# Patient Record
Sex: MALE | Race: BLACK OR AFRICAN AMERICAN | NOT HISPANIC OR LATINO
[De-identification: names, ages, dates, MRNs, and addresses within clinical notes are randomized per-mention and may not be internally consistent; named-entity substitution may affect disease eponyms.]

---

## 2022-12-05 ENCOUNTER — APPOINTMENT (OUTPATIENT)
Dept: ELECTROPHYSIOLOGY | Facility: CLINIC | Age: 47
End: 2022-12-05

## 2022-12-05 ENCOUNTER — NON-APPOINTMENT (OUTPATIENT)
Age: 47
End: 2022-12-05

## 2022-12-05 VITALS
BODY MASS INDEX: 44.73 KG/M2 | HEIGHT: 67 IN | OXYGEN SATURATION: 98 % | SYSTOLIC BLOOD PRESSURE: 145 MMHG | TEMPERATURE: 98.4 F | HEART RATE: 84 BPM | DIASTOLIC BLOOD PRESSURE: 89 MMHG | WEIGHT: 285 LBS

## 2022-12-05 PROCEDURE — 93000 ELECTROCARDIOGRAM COMPLETE: CPT

## 2022-12-05 PROCEDURE — 99205 OFFICE O/P NEW HI 60 MIN: CPT | Mod: 25

## 2022-12-05 NOTE — HISTORY OF PRESENT ILLNESS
[FreeTextEntry1] : Shon Colorado is a 46y/o man with Hx of HTN, HLD, DMII, and CVA who presents today for initial evaluation. Admits CVA back in October. He was at work and fell asleep and when he awoke he couldn't move his right leg and felt heaviness throughout the right side of his body. Was seen at [facility] and was found to have a CVA. Saw Cardiologist and pending SHARMIN. No cardiac monitoring.  Admits doing well with no issues or complaints. Denies chest pain, palpitations, SOB, syncope or near syncope.\par

## 2022-12-05 NOTE — CARDIOLOGY SUMMARY
[de-identified] : 11/4/2021: normal  [de-identified] : 10/28/2021: normal LV function, LVEF 60%

## 2022-12-05 NOTE — DISCUSSION/SUMMARY
[EKG obtained to assist in diagnosis and management of assessed problem(s)] : EKG obtained to assist in diagnosis and management of assessed problem(s) [FreeTextEntry1] : Impression:\par \par 1. CVA: EKG performed today to assess for presence of afib and reveals NSR. ECHO with normal LVEF. Discussed monitoring methods to assess for presence of possible paroxysmal afib such as 30 day CardioNet vs ILR monitoring. Recommend undergoing placement of ILR for long term monitoring of suspected atrial fibrillation in setting of CVA c/w Crystal AF study. Risks, benefits, and alternatives to procedure discussed. Will plan for CardioNet first prior to ILR. If CardioNet without evidence of afib, consider ILR at that time.\par \par 2. HTN: resume oral antihypertensives as prescribed. Encouraged heart healthy diet, sodium restriction, and weight loss. Continue regular f/u with Cardiologist for further HTN management.\par \par 3. HLD: resume statin therapy as prescribed and regular f/u with Cardiologist for routine lipid monitoring and management.\par \par Plan for 30 day CardioNet and ILR in future.

## 2022-12-23 ENCOUNTER — OUTPATIENT (OUTPATIENT)
Dept: OUTPATIENT SERVICES | Facility: HOSPITAL | Age: 47
LOS: 1 days | End: 2022-12-23
Payer: COMMERCIAL

## 2022-12-23 DIAGNOSIS — Z86.73 PERSONAL HISTORY OF TRANSIENT ISCHEMIC ATTACK (TIA), AND CEREBRAL INFARCTION WITHOUT RESIDUAL DEFICITS: ICD-10-CM

## 2022-12-23 LAB — GLUCOSE BLDC GLUCOMTR-MCNC: 108 MG/DL — HIGH (ref 70–99)

## 2022-12-23 PROCEDURE — 82962 GLUCOSE BLOOD TEST: CPT

## 2022-12-23 PROCEDURE — 93312 ECHO TRANSESOPHAGEAL: CPT

## 2022-12-23 PROCEDURE — 93320 DOPPLER ECHO COMPLETE: CPT | Mod: 26

## 2022-12-23 PROCEDURE — 93320 DOPPLER ECHO COMPLETE: CPT

## 2022-12-23 PROCEDURE — 93325 DOPPLER ECHO COLOR FLOW MAPG: CPT | Mod: 26

## 2022-12-23 PROCEDURE — 93312 ECHO TRANSESOPHAGEAL: CPT | Mod: 26

## 2022-12-23 PROCEDURE — 93325 DOPPLER ECHO COLOR FLOW MAPG: CPT

## 2023-03-21 ENCOUNTER — APPOINTMENT (OUTPATIENT)
Dept: NEUROLOGY | Facility: CLINIC | Age: 48
End: 2023-03-21
Payer: COMMERCIAL

## 2023-03-21 VITALS
OXYGEN SATURATION: 98 % | WEIGHT: 287 LBS | DIASTOLIC BLOOD PRESSURE: 84 MMHG | TEMPERATURE: 97.9 F | BODY MASS INDEX: 45.04 KG/M2 | SYSTOLIC BLOOD PRESSURE: 132 MMHG | HEART RATE: 96 BPM | HEIGHT: 67 IN

## 2023-03-21 DIAGNOSIS — E66.9 OBESITY, UNSPECIFIED: ICD-10-CM

## 2023-03-21 PROCEDURE — 99205 OFFICE O/P NEW HI 60 MIN: CPT

## 2023-03-21 NOTE — CONSULT LETTER
[Dear  ___] : Dear  [unfilled], [Consult Letter:] : I had the pleasure of evaluating your patient, [unfilled]. [Please see my note below.] : Please see my note below. [Consult Closing:] : Thank you very much for allowing me to participate in the care of this patient.  If you have any questions, please do not hesitate to contact me. [Sincerely,] : Sincerely, [FreeTextEntry3] : Mehreen Jamison MD, MPH\par

## 2023-03-21 NOTE — ASSESSMENT
[FreeTextEntry1] : And was noted in [facility] where it right-sided numbness and weakness due to presumed stroke, discharge paperwork not available, patient's symptoms that resolved.  He has history of hypertension and hyperlipidemia as well as diabetes which do increase his risk for lacunar infraction.  Holter monitor to date has been unremarkable.  For secondary stroke prevention, will plan to continue with aspirin and statin, patient does not know the milligrams of statin that he takes at this time and will bring at next visit.  Blood pressure goal is of normal, based on the information available at this time.  Will plan to check LDL and hemoglobin A1c levels.  We will adjust treatment afterwards.\par \par Discussed with patient the importance of weight loss in control of vascular risk factors, the patient declines nutritionist referral at this time.\par \par I spent the time noted on the day of this patient encounter preparing for, providing and documenting the above E/M service and counseling and educate patient on differential, workup, disease course, and treatment/management. Education was provided to the patient during this encounter. All questions and concerns were answered and addressed in detail. The patient verbalized understanding and agreed to plan. Patient was advised to continue to monitor for neurologic symptoms and to notify my office or go to the nearest emergency room if there are any changes. Any orders/referrals and communications were provided as well. \par Side effects of the above medications were discussed in detail including but not limited to applicable black box warning and teratogenicity as appropriate. \par Patient was advised to bring previous records to my office, including CD of imaging, when applicable. \par \par

## 2023-03-21 NOTE — DATA REVIEWED
[de-identified] : Cardiology note appreciated\par EKG sinus rhythm\par Holter results from January 2023 noted

## 2023-03-21 NOTE — HISTORY OF PRESENT ILLNESS
[FreeTextEntry1] : 47 y/o man with Hx of HTN, HLD, DMII, and CVA who presents today for initial evaluation. Admits CVA back in October 2022. He was at work and fell asleep and when he awoke he couldn't move his right leg and felt heaviness throughout the right side of his body. Was seen at [facility] and was found to have a CVA. Symptoms resolved.

## 2023-03-21 NOTE — PHYSICAL EXAM
[General Appearance - Alert] : alert [General Appearance - In No Acute Distress] : in no acute distress [Person] : oriented to person [Place] : oriented to place [Time] : oriented to time [Concentration Intact] : normal concentrating ability [Naming Objects] : no difficulty naming common objects [Fluency] : fluency intact [Comprehension] : comprehension intact [Vocabulary] : adequate range of vocabulary [Cranial Nerves Optic (II)] : visual acuity intact bilaterally,  visual fields full to confrontation, pupils equal round and reactive to light [Cranial Nerves Oculomotor (III)] : extraocular motion intact [Cranial Nerves Trigeminal (V)] : facial sensation intact symmetrically [Cranial Nerves Facial (VII)] : face symmetrical [Cranial Nerves Vestibulocochlear (VIII)] : hearing was intact bilaterally [Cranial Nerves Glossopharyngeal (IX)] : tongue and palate midline [Cranial Nerves Accessory (XI - Cranial And Spinal)] : head turning and shoulder shrug symmetric [Cranial Nerves Hypoglossal (XII)] : there was no tongue deviation with protrusion [Motor Tone] : muscle tone was normal in all four extremities [Motor Strength] : muscle strength was normal in all four extremities [Involuntary Movements] : no involuntary movements were seen [Sensation Tactile Decrease] : light touch was intact [Abnormal Walk] : normal gait [Balance] : balance was intact [Coordination - Dysmetria Impaired Finger-to-Nose Bilateral] : not present [Coordination - Dysmetria Impaired Heel-to-Shin Bilateral] : not present

## 2023-07-31 ENCOUNTER — APPOINTMENT (OUTPATIENT)
Dept: ELECTROPHYSIOLOGY | Facility: CLINIC | Age: 48
End: 2023-07-31
Payer: COMMERCIAL

## 2023-07-31 VITALS
BODY MASS INDEX: 45.04 KG/M2 | TEMPERATURE: 97.9 F | HEART RATE: 68 BPM | DIASTOLIC BLOOD PRESSURE: 86 MMHG | OXYGEN SATURATION: 99 % | WEIGHT: 287 LBS | SYSTOLIC BLOOD PRESSURE: 143 MMHG | HEIGHT: 67 IN

## 2023-07-31 PROCEDURE — 93000 ELECTROCARDIOGRAM COMPLETE: CPT

## 2023-07-31 PROCEDURE — 99214 OFFICE O/P EST MOD 30 MIN: CPT | Mod: 25

## 2023-07-31 NOTE — DISCUSSION/SUMMARY
[EKG obtained to assist in diagnosis and management of assessed problem(s)] : EKG obtained to assist in diagnosis and management of assessed problem(s) [FreeTextEntry1] : Impression:  1. CVA: EKG performed today to assess for presence of afib and reveals NSR. ECHO with normal LVEF. 30 day Holter without evidence of afib. Recommend undergoing placement of ILR for long term monitoring of suspected atrial fibrillation in setting of CVA c/w Crystal AF study. Risks, benefits, and alternatives to procedure discussed.   2. HTN: resume oral antihypertensives as prescribed. Encouraged heart healthy diet, sodium restriction, and weight loss. Continue regular f/u with Cardiologist for further HTN management.  3. HLD: resume statin therapy as prescribed and regular f/u with Cardiologist for routine lipid monitoring and management.  Plan for ILR placement.

## 2023-07-31 NOTE — CARDIOLOGY SUMMARY
[de-identified] : 11/4/2021: normal  [de-identified] : 10/28/2021: normal LV function, LVEF 60%

## 2023-07-31 NOTE — HISTORY OF PRESENT ILLNESS
[FreeTextEntry1] : Shon Colorado is a 47y/o man with Hx of HTN, HLD, DMII, and CVA (10/2022) who presents today for routine f/u. Admits CVA back in October. He was at work and fell asleep and when he awoke he couldn't move his right leg and felt heaviness throughout the right side of his body. Was seen at [facility] and was found to have a CVA. On prior visit, underwent 30 day Holter without evidence of afib. Admits doing well with no issues or complaints. Denies chest pain, palpitations, SOB, syncope or near syncope.

## 2023-08-11 ENCOUNTER — OUTPATIENT (OUTPATIENT)
Dept: OUTPATIENT SERVICES | Facility: HOSPITAL | Age: 48
LOS: 1 days | Discharge: HOME | End: 2023-08-11
Payer: COMMERCIAL

## 2023-08-11 DIAGNOSIS — I63.9 CEREBRAL INFARCTION, UNSPECIFIED: ICD-10-CM

## 2023-08-11 PROCEDURE — 33285 INSJ SUBQ CAR RHYTHM MNTR: CPT

## 2023-08-11 NOTE — CHART NOTE - NSCHARTNOTEFT_GEN_A_CORE
ELECTROPHYSIOLOGY      Patient s/p implantation of an ILR for CVA.   Tolerated the procedure well. No complications.   Vital signs stable. Telemetry: NSR  Dressing dry and intact. No evidence of bleeding, swelling or ecchymosis.  Post procedure ILR teaching done.  Written instructions and contact information provided.   Patient was given a home monitor with verbal and written instructions.   All questions answered.   He has a follow-up appointment in the device clinic on 8/25/23 at 11:20am  4th [facility] On[facility] [phone].

## 2023-08-11 NOTE — H&P CARDIOLOGY - HISTORY OF PRESENT ILLNESS
48 year old male with HTN, HLD, DM type 2, CVA (10/2022) who present for loop recorder implant for long term monitoring of suspected atrial fibrillation in setting of CVA.   Admits doing well with no issues or complaints. Denies chest pain, palpitations, SOB, syncope or near syncope. ECHO with normal LVEF. 30 day Holter monitor without evidence of afib.     please see hard copy H&P in [employer] chart 7/31/23  PATIENT SEEN AND EXAMINED AND NO NEW CLINICAL CHANGES SINCE office visit 48 year old male with HTN, HLD, DM type 2, CVA (10/2022) - no residual deficits who present for loop recorder implant for long term monitoring of suspected atrial fibrillation in setting of CVA.   Admits doing well with no issues or complaints. Denies chest pain, palpitations, SOB, syncope or near syncope. ECHO with normal LVEF. 30 day Holter monitor without evidence of afib.     please see hard copy H&P in [employer] chart 7/31/23  PATIENT SEEN AND EXAMINED AND NO NEW CLINICAL CHANGES SINCE office visit

## 2023-08-14 ENCOUNTER — NON-APPOINTMENT (OUTPATIENT)
Age: 48
End: 2023-08-14

## 2023-08-14 ENCOUNTER — APPOINTMENT (OUTPATIENT)
Dept: CARDIOLOGY | Facility: CLINIC | Age: 48
End: 2023-08-14
Payer: COMMERCIAL

## 2023-08-14 VITALS
WEIGHT: 280 LBS | SYSTOLIC BLOOD PRESSURE: 151 MMHG | DIASTOLIC BLOOD PRESSURE: 91 MMHG | HEIGHT: 67 IN | OXYGEN SATURATION: 100 % | HEART RATE: 64 BPM | BODY MASS INDEX: 43.95 KG/M2

## 2023-08-14 PROCEDURE — 99205 OFFICE O/P NEW HI 60 MIN: CPT | Mod: 25

## 2023-08-14 PROCEDURE — 93000 ELECTROCARDIOGRAM COMPLETE: CPT

## 2023-08-14 RX ORDER — GLIMEPIRIDE 4 MG/1
4 TABLET ORAL
Refills: 0 | Status: ACTIVE | COMMUNITY

## 2023-08-14 RX ORDER — AMLODIPINE BESYLATE 10 MG/1
10 TABLET ORAL
Refills: 0 | Status: ACTIVE | COMMUNITY

## 2023-08-14 RX ORDER — CLOPIDOGREL BISULFATE 75 MG/1
75 TABLET, FILM COATED ORAL DAILY
Qty: 1 | Refills: 3 | Status: ACTIVE | COMMUNITY
Start: 2023-08-14 | End: 1900-01-01

## 2023-08-14 RX ORDER — SITAGLIPTIN 100 MG/1
100 TABLET, FILM COATED ORAL
Refills: 0 | Status: ACTIVE | COMMUNITY

## 2023-08-14 RX ORDER — LOSARTAN POTASSIUM AND HYDROCHLOROTHIAZIDE 12.5; 1 MG/1; MG/1
100-12.5 TABLET ORAL
Refills: 0 | Status: ACTIVE | COMMUNITY

## 2023-08-14 RX ORDER — ATORVASTATIN CALCIUM 80 MG/1
80 TABLET, FILM COATED ORAL
Refills: 0 | Status: ACTIVE | COMMUNITY

## 2023-08-25 ENCOUNTER — APPOINTMENT (OUTPATIENT)
Dept: ELECTROPHYSIOLOGY | Facility: CLINIC | Age: 48
End: 2023-08-25
Payer: COMMERCIAL

## 2023-08-25 VITALS — DIASTOLIC BLOOD PRESSURE: 84 MMHG | HEART RATE: 75 BPM | SYSTOLIC BLOOD PRESSURE: 133 MMHG | RESPIRATION RATE: 14 BRPM

## 2023-08-25 DIAGNOSIS — E11.9 TYPE 2 DIABETES MELLITUS W/OUT COMPLICATIONS: ICD-10-CM

## 2023-08-25 PROCEDURE — 93291 INTERROG DEV EVAL SCRMS IP: CPT

## 2023-09-07 ENCOUNTER — APPOINTMENT (OUTPATIENT)
Dept: CV DIAGNOSITCS | Facility: HOSPITAL | Age: 48
End: 2023-09-07

## 2023-09-07 ENCOUNTER — OUTPATIENT (OUTPATIENT)
Dept: OUTPATIENT SERVICES | Facility: HOSPITAL | Age: 48
LOS: 1 days | End: 2023-09-07
Payer: COMMERCIAL

## 2023-09-07 ENCOUNTER — TRANSCRIPTION ENCOUNTER (OUTPATIENT)
Age: 48
End: 2023-09-07

## 2023-09-07 VITALS
HEIGHT: 67 IN | RESPIRATION RATE: 16 BRPM | DIASTOLIC BLOOD PRESSURE: 68 MMHG | TEMPERATURE: 98 F | HEART RATE: 67 BPM | SYSTOLIC BLOOD PRESSURE: 119 MMHG | OXYGEN SATURATION: 99 % | WEIGHT: 279.99 LBS

## 2023-09-07 VITALS
HEART RATE: 65 BPM | OXYGEN SATURATION: 95 % | SYSTOLIC BLOOD PRESSURE: 119 MMHG | RESPIRATION RATE: 17 BRPM | DIASTOLIC BLOOD PRESSURE: 72 MMHG

## 2023-09-07 DIAGNOSIS — Q21.10 ATRIAL SEPTAL DEFECT, UNSPECIFIED: ICD-10-CM

## 2023-09-07 DIAGNOSIS — Z95.818 PRESENCE OF OTHER CARDIAC IMPLANTS AND GRAFTS: Chronic | ICD-10-CM

## 2023-09-07 DIAGNOSIS — Z98.890 OTHER SPECIFIED POSTPROCEDURAL STATES: Chronic | ICD-10-CM

## 2023-09-07 LAB
ANION GAP SERPL CALC-SCNC: 13 MMOL/L — SIGNIFICANT CHANGE UP (ref 5–17)
BUN SERPL-MCNC: 15 MG/DL — SIGNIFICANT CHANGE UP (ref 7–23)
CALCIUM SERPL-MCNC: 9 MG/DL — SIGNIFICANT CHANGE UP (ref 8.4–10.5)
CHLORIDE SERPL-SCNC: 104 MMOL/L — SIGNIFICANT CHANGE UP (ref 96–108)
CO2 SERPL-SCNC: 24 MMOL/L — SIGNIFICANT CHANGE UP (ref 22–31)
CREAT SERPL-MCNC: 0.98 MG/DL — SIGNIFICANT CHANGE UP (ref 0.5–1.3)
EGFR: 96 ML/MIN/1.73M2 — SIGNIFICANT CHANGE UP
GLUCOSE BLDC GLUCOMTR-MCNC: 118 MG/DL — HIGH (ref 70–99)
GLUCOSE BLDC GLUCOMTR-MCNC: 129 MG/DL — HIGH (ref 70–99)
GLUCOSE BLDC GLUCOMTR-MCNC: 130 MG/DL — HIGH (ref 70–99)
GLUCOSE SERPL-MCNC: 144 MG/DL — HIGH (ref 70–99)
HCT VFR BLD CALC: 46.5 % — SIGNIFICANT CHANGE UP (ref 39–50)
HGB BLD-MCNC: 14.6 G/DL — SIGNIFICANT CHANGE UP (ref 13–17)
MCHC RBC-ENTMCNC: 26.8 PG — LOW (ref 27–34)
MCHC RBC-ENTMCNC: 31.4 GM/DL — LOW (ref 32–36)
MCV RBC AUTO: 85.5 FL — SIGNIFICANT CHANGE UP (ref 80–100)
NRBC # BLD: 0 /100 WBCS — SIGNIFICANT CHANGE UP (ref 0–0)
PLATELET # BLD AUTO: 185 K/UL — SIGNIFICANT CHANGE UP (ref 150–400)
POTASSIUM SERPL-MCNC: 4 MMOL/L — SIGNIFICANT CHANGE UP (ref 3.5–5.3)
POTASSIUM SERPL-SCNC: 4 MMOL/L — SIGNIFICANT CHANGE UP (ref 3.5–5.3)
RBC # BLD: 5.44 M/UL — SIGNIFICANT CHANGE UP (ref 4.2–5.8)
RBC # FLD: 14.6 % — HIGH (ref 10.3–14.5)
SODIUM SERPL-SCNC: 141 MMOL/L — SIGNIFICANT CHANGE UP (ref 135–145)
WBC # BLD: 5.44 K/UL — SIGNIFICANT CHANGE UP (ref 3.8–10.5)
WBC # FLD AUTO: 5.44 K/UL — SIGNIFICANT CHANGE UP (ref 3.8–10.5)

## 2023-09-07 PROCEDURE — 93580 TRANSCATH CLOSURE OF ASD: CPT

## 2023-09-07 PROCEDURE — 85027 COMPLETE CBC AUTOMATED: CPT

## 2023-09-07 PROCEDURE — C1887: CPT

## 2023-09-07 PROCEDURE — C1817: CPT

## 2023-09-07 PROCEDURE — 93010 ELECTROCARDIOGRAM REPORT: CPT | Mod: 76

## 2023-09-07 PROCEDURE — 93662 INTRACARDIAC ECG (ICE): CPT

## 2023-09-07 PROCEDURE — C1769: CPT

## 2023-09-07 PROCEDURE — C1766: CPT

## 2023-09-07 PROCEDURE — 93005 ELECTROCARDIOGRAM TRACING: CPT

## 2023-09-07 PROCEDURE — 99152 MOD SED SAME PHYS/QHP 5/>YRS: CPT

## 2023-09-07 PROCEDURE — C1894: CPT

## 2023-09-07 PROCEDURE — C1759: CPT

## 2023-09-07 PROCEDURE — 80048 BASIC METABOLIC PNL TOTAL CA: CPT

## 2023-09-07 PROCEDURE — 82962 GLUCOSE BLOOD TEST: CPT

## 2023-09-07 PROCEDURE — 93662 INTRACARDIAC ECG (ICE): CPT | Mod: 26

## 2023-09-07 RX ORDER — CLOPIDOGREL BISULFATE 75 MG/1
75 TABLET, FILM COATED ORAL DAILY
Refills: 0 | Status: DISCONTINUED | OUTPATIENT
Start: 2023-09-07 | End: 2023-09-21

## 2023-09-07 RX ORDER — ASPIRIN/CALCIUM CARB/MAGNESIUM 324 MG
1 TABLET ORAL
Refills: 0 | DISCHARGE

## 2023-09-07 RX ORDER — SODIUM CHLORIDE 9 MG/ML
1000 INJECTION, SOLUTION INTRAVENOUS
Refills: 0 | Status: DISCONTINUED | OUTPATIENT
Start: 2023-09-07 | End: 2023-09-21

## 2023-09-07 RX ORDER — ATORVASTATIN CALCIUM 80 MG/1
80 TABLET, FILM COATED ORAL AT BEDTIME
Refills: 0 | Status: DISCONTINUED | OUTPATIENT
Start: 2023-09-07 | End: 2023-09-21

## 2023-09-07 RX ORDER — GLUCAGON INJECTION, SOLUTION 0.5 MG/.1ML
1 INJECTION, SOLUTION SUBCUTANEOUS ONCE
Refills: 0 | Status: DISCONTINUED | OUTPATIENT
Start: 2023-09-07 | End: 2023-09-21

## 2023-09-07 RX ORDER — INSULIN GLARGINE 100 [IU]/ML
10 INJECTION, SOLUTION SUBCUTANEOUS
Refills: 0 | DISCHARGE

## 2023-09-07 RX ORDER — LOSARTAN POTASSIUM 100 MG/1
100 TABLET, FILM COATED ORAL DAILY
Refills: 0 | Status: DISCONTINUED | OUTPATIENT
Start: 2023-09-07 | End: 2023-09-21

## 2023-09-07 RX ORDER — AMLODIPINE BESYLATE 2.5 MG/1
1 TABLET ORAL
Refills: 0 | DISCHARGE

## 2023-09-07 RX ORDER — DEXTROSE 50 % IN WATER 50 %
25 SYRINGE (ML) INTRAVENOUS ONCE
Refills: 0 | Status: DISCONTINUED | OUTPATIENT
Start: 2023-09-07 | End: 2023-09-21

## 2023-09-07 RX ORDER — GLIMEPIRIDE 1 MG
1 TABLET ORAL
Refills: 0 | DISCHARGE

## 2023-09-07 RX ORDER — SODIUM CHLORIDE 9 MG/ML
250 INJECTION INTRAMUSCULAR; INTRAVENOUS; SUBCUTANEOUS ONCE
Refills: 0 | Status: DISCONTINUED | OUTPATIENT
Start: 2023-09-07 | End: 2023-09-21

## 2023-09-07 RX ORDER — INSULIN LISPRO 100/ML
VIAL (ML) SUBCUTANEOUS AT BEDTIME
Refills: 0 | Status: DISCONTINUED | OUTPATIENT
Start: 2023-09-07 | End: 2023-09-21

## 2023-09-07 RX ORDER — ASPIRIN/CALCIUM CARB/MAGNESIUM 324 MG
81 TABLET ORAL DAILY
Refills: 0 | Status: DISCONTINUED | OUTPATIENT
Start: 2023-09-07 | End: 2023-09-21

## 2023-09-07 RX ORDER — DEXTROSE 50 % IN WATER 50 %
12.5 SYRINGE (ML) INTRAVENOUS ONCE
Refills: 0 | Status: DISCONTINUED | OUTPATIENT
Start: 2023-09-07 | End: 2023-09-21

## 2023-09-07 RX ORDER — AMLODIPINE BESYLATE 2.5 MG/1
10 TABLET ORAL DAILY
Refills: 0 | Status: DISCONTINUED | OUTPATIENT
Start: 2023-09-07 | End: 2023-09-21

## 2023-09-07 RX ORDER — SODIUM CHLORIDE 9 MG/ML
450 INJECTION INTRAMUSCULAR; INTRAVENOUS; SUBCUTANEOUS
Refills: 0 | Status: DISCONTINUED | OUTPATIENT
Start: 2023-09-07 | End: 2023-09-21

## 2023-09-07 RX ORDER — INSULIN LISPRO 100/ML
VIAL (ML) SUBCUTANEOUS
Refills: 0 | Status: DISCONTINUED | OUTPATIENT
Start: 2023-09-07 | End: 2023-09-21

## 2023-09-07 RX ORDER — METOPROLOL TARTRATE 50 MG
1 TABLET ORAL
Refills: 0 | DISCHARGE

## 2023-09-07 RX ORDER — CLOPIDOGREL BISULFATE 75 MG/1
1 TABLET, FILM COATED ORAL
Refills: 0 | DISCHARGE

## 2023-09-07 RX ORDER — METFORMIN HYDROCHLORIDE 850 MG/1
1 TABLET ORAL
Refills: 0 | DISCHARGE

## 2023-09-07 RX ORDER — SODIUM CHLORIDE 9 MG/ML
250 INJECTION INTRAMUSCULAR; INTRAVENOUS; SUBCUTANEOUS
Refills: 0 | Status: DISCONTINUED | OUTPATIENT
Start: 2023-09-07 | End: 2023-09-21

## 2023-09-07 RX ORDER — CEFAZOLIN SODIUM 1 G
2000 VIAL (EA) INJECTION ONCE
Refills: 0 | Status: COMPLETED | OUTPATIENT
Start: 2023-09-07 | End: 2023-09-07

## 2023-09-07 RX ORDER — SITAGLIPTIN 50 MG/1
1 TABLET, FILM COATED ORAL
Refills: 0 | DISCHARGE

## 2023-09-07 RX ORDER — EMPAGLIFLOZIN 10 MG/1
1 TABLET, FILM COATED ORAL
Refills: 0 | DISCHARGE

## 2023-09-07 RX ORDER — DEXTROSE 50 % IN WATER 50 %
15 SYRINGE (ML) INTRAVENOUS ONCE
Refills: 0 | Status: DISCONTINUED | OUTPATIENT
Start: 2023-09-07 | End: 2023-09-21

## 2023-09-07 RX ORDER — SITAGLIPTIN AND METFORMIN HYDROCHLORIDE 500; 50 MG/1; MG/1
1 TABLET, FILM COATED ORAL
Qty: 0 | Refills: 0 | DISCHARGE

## 2023-09-07 RX ORDER — ATORVASTATIN CALCIUM 80 MG/1
1 TABLET, FILM COATED ORAL
Refills: 0 | DISCHARGE

## 2023-09-07 RX ORDER — LOSARTAN/HYDROCHLOROTHIAZIDE 100MG-25MG
1 TABLET ORAL
Refills: 0 | DISCHARGE

## 2023-09-07 RX ORDER — METOPROLOL TARTRATE 50 MG
25 TABLET ORAL DAILY
Refills: 0 | Status: DISCONTINUED | OUTPATIENT
Start: 2023-09-07 | End: 2023-09-21

## 2023-09-07 RX ADMIN — Medication 100 MILLIGRAM(S): at 16:32

## 2023-09-07 RX ADMIN — SODIUM CHLORIDE 75 MILLILITER(S): 9 INJECTION INTRAMUSCULAR; INTRAVENOUS; SUBCUTANEOUS at 09:56

## 2023-09-07 NOTE — CHART NOTE - NSCHARTNOTEFT_GEN_A_CORE
Removal of Femoral Suture s/p PFO closure    Pulses in the right lower extremity are palpable.   The patient was placed in the supine position. The insertion site was identified and the sutures were removed per protocol.    The RFV suture was removed.   Direct pressure was applied for  _5_ minutes.   Complications: None, VSS, Good Hemostasis.     Monitoring of the right groin and both lower extremities including neuro-vascular checks and vital signs every 15 minutes x 4, then every 30 minutes x 2, then every 1 hour was ordered.    Discharge Instruction discussed with patient: ASA, Plavix/Brilinta, statin, diet, activities, access site care, follow up care, reportable signs and symptoms.     A/P   48 year old male s/p PFO closure       Continue to monitor,   continue ASA, Plavix and statin,   Diet and exercise after 3 days,   f/u with Dr. Holliday in 2 weeks and Dr. alicia in 6 months (call office for an appointment of TTE and Dr. Alicia)   Procedure results, risk and benefits of ASA, Plavix, diet and activities,   F/u care, reportable signs and Symptoms discussed with pt  Pt understood and verbalized. 2

## 2023-09-07 NOTE — ASU DISCHARGE PLAN (ADULT/PEDIATRIC) - CARE PROVIDER_API CALL
Arron Holliday  Cardiac Electrophysiology  [address] [place] [address], [address]  [place] [address]  Phone: [phone]  Fax: [phone]  Follow Up Time: 2 weeks

## 2023-09-07 NOTE — ASU PATIENT PROFILE, ADULT - FALL HARM RISK - UNIVERSAL INTERVENTIONS
Prepped: right chest and right neck. Prepped with: ChloraPrep. Bed in lowest position, wheels locked, appropriate side rails in place/Call bell, personal items and telephone in reach/Instruct patient to call for assistance before getting out of bed or chair/Non-slip footwear when patient is out of bed/[place] to call system/Physically safe environment - no spills, clutter or unnecessary equipment/Purposeful Proactive Rounding/Room/bathroom lighting operational, light cord in reach

## 2023-09-07 NOTE — H&P CARDIOLOGY - NSICDXPASTMEDICALHX_GEN_ALL_CORE_FT
PAST MEDICAL HISTORY:  CVA (cerebral vascular accident)     Diabetes mellitus     HLD (hyperlipidemia)     HTN (hypertension)     PFO (patent foramen ovale)

## 2023-09-07 NOTE — H&P CARDIOLOGY - HISTORY OF PRESENT ILLNESS
This is a 48 year old male with PMH of HTN, HLD, DM type 2 ( last A1C unknown, well managed w/o complications as per pt), CVA (10/2022) - no residual deficits s/p loop recorder implant on 8/11/2023, ECHO from 12/2022 with PFO ( see report below).  Presents here today for PFO closure.     < from: SHARMIN w/Doppler (12.23.22 @ 09:00) >  CONCLUSIONS:  1. Normal mitral valve. Mild mitral regurgitation.  2. Normal trileaflet aortic valve. No aortic stenosis. No  aortic valve regurgitation seen.  3. Normal aortic root. No atheroma is seen in the  descending aorta and aortic arch.  4. No left atrium or left atrialappendage thrombus.  5. Normal Left Ventricular Systolic Function,  (EF = 55 to  60%)  6. Normal right ventricular size and function.  7. Normal tricuspid valve. Trace tricuspid regurgitation.  8. Color Doppler of the atrial septum demonstrates left to  right shunting consistent with a  PFO. Agitated saline  injection revealed bubbles in the left heart, consistent  with patent foramen ovale.  9. No pericardial effusion.  10. No cardiac source of embolus is identified.    < end of copied text >      This is a 48 year old male with PMH of HTN, HLD, DM type 2 ( last A1C approx 9.7%, well managed w/o complications as per pt), CVA (10/2022) - no residual deficits s/p loop recorder implant on 8/11/2023, ECHO from 12/2022 with PFO ( see report below).  Presents here today for PFO closure.     < from: SHARMIN w/Doppler (12.23.22 @ 09:00) >  CONCLUSIONS:  1. Normal mitral valve. Mild mitral regurgitation.  2. Normal trileaflet aortic valve. No aortic stenosis. No  aortic valve regurgitation seen.  3. Normal aortic root. No atheroma is seen in the  descending aorta and aortic arch.  4. No left atrium or left atrialappendage thrombus.  5. Normal Left Ventricular Systolic Function,  (EF = 55 to  60%)  6. Normal right ventricular size and function.  7. Normal tricuspid valve. Trace tricuspid regurgitation.  8. Color Doppler of the atrial septum demonstrates left to  right shunting consistent with a  PFO. Agitated saline  injection revealed bubbles in the left heart, consistent  with patent foramen ovale.  9. No pericardial effusion.  10. No cardiac source of embolus is identified.    < end of copied text >

## 2023-09-07 NOTE — ASU PATIENT PROFILE, ADULT - AS SC BRADEN MOBILITY
Chief Complaint   Patient presents with    Allergic Reaction     Possible allergic reaction from anti-biotic prescribed on yesterday due Pnuemonia; rash on legs (4) no limitation

## 2023-09-07 NOTE — H&P CARDIOLOGY - NSICDXPASTSURGICALHX_GEN_ALL_CORE_FT
PAST SURGICAL HISTORY:  Implantable loop recorder present      PAST SURGICAL HISTORY:  H/O Achilles tendon repair     H/O hernia repair     Implantable loop recorder present

## 2023-09-07 NOTE — ASU DISCHARGE PLAN (ADULT/PEDIATRIC) - ASU DC SPECIAL INSTRUCTIONSFT
Wound Care:   the day AFTER your procedure remove bandage GENTLY, and clean using  mild soap and warm water stream, pat dry. Leave the area OPEN to air. YOU MAY SHOWER 24 hour after procedure.   DO NOT apply lotions, creams, ointments, powder, perfumes to the puncture site.  DO NOT SOAK the site for 1 week (no tub bath, no swimming, etc.)  Check  your groin and /or wrist daily. A small amount of bruising and sourness are normal.     ACTIVITY: for 24 hours   - DO NOT DRIVE  - DO NOT make any important decisions or sign legal documents   - DO NOT operate heavy duty machineries   - you may resume sexual activity in 48 hours, unless otherwise instructed by your cardiologist     If your procedure was done through the WRIST: for the NEXT 3DAYS:  - avoid pushing, pulling, with that affected wrist   - avoid repeated movement of that hand and wrist (eg: typing, hammering)  - DO NOT LIFT anything more than 5 lbs     If your procedure was done through the GROIN: for the NEXT 5 DAYS  - Limit climbing stairs, DO NOT soak in bathtub or pool  - no strenuous activities, pushing, pulling, straining  - Do not lift anything 10lbs or heavier     MEDICATION:   Take your medications as explained (see discharge paperwork)   If you received a STENT, you will be taking antiplatelet medications to KEEP YOUR STENT OPEN (eg: Aspirin, Plavix, Brilinta, Effient, etc).  Take as prescribed, DO NOT STOP taking them without consulting with your cardiologist first.     Follow heart healthy diet recommended by your doctor, if you smoke STOP SMOKING (may call [phone] for center of tobacco control if you need assistance)     CALL your doctor to make appointment in 2 WEEKS     ***CALL YOUR DOCTOR***  if you experience: fever, chills, body aches, or severe pain, swelling, redness, heat or yellow discharge at incision site  If you experience Bleeding or excruciating pain at the procedural site, swelling (golf ball size) at your procedural site  If you experience CHEST PAIN  If you experience extremity numbness, tingling, temperature change (of your procedural site)   If you are unable to reach your doctor, you may contact:   -Cardiology Office at [facility] at [phone] or [facility] [phone]- [facility] [phone]

## 2023-11-08 PROBLEM — E11.9 TYPE 2 DIABETES MELLITUS WITHOUT COMPLICATIONS: Chronic | Status: ACTIVE | Noted: 2023-09-07

## 2023-11-08 PROBLEM — I10 ESSENTIAL (PRIMARY) HYPERTENSION: Chronic | Status: ACTIVE | Noted: 2023-09-07

## 2023-11-08 PROBLEM — E78.5 HYPERLIPIDEMIA, UNSPECIFIED: Chronic | Status: ACTIVE | Noted: 2023-09-07

## 2023-11-08 PROBLEM — Q21.12 PATENT FORAMEN OVALE: Chronic | Status: ACTIVE | Noted: 2023-09-07

## 2023-11-08 PROBLEM — I63.9 CEREBRAL INFARCTION, UNSPECIFIED: Chronic | Status: ACTIVE | Noted: 2023-09-07

## 2023-11-22 ENCOUNTER — APPOINTMENT (OUTPATIENT)
Dept: CARDIOLOGY | Facility: CLINIC | Age: 48
End: 2023-11-22
Payer: COMMERCIAL

## 2023-11-22 VITALS
SYSTOLIC BLOOD PRESSURE: 135 MMHG | BODY MASS INDEX: 43.95 KG/M2 | DIASTOLIC BLOOD PRESSURE: 89 MMHG | HEART RATE: 76 BPM | OXYGEN SATURATION: 100 % | HEIGHT: 67 IN | WEIGHT: 280 LBS

## 2023-11-22 DIAGNOSIS — I63.9 CEREBRAL INFARCTION, UNSPECIFIED: ICD-10-CM

## 2023-11-22 DIAGNOSIS — E78.5 HYPERLIPIDEMIA, UNSPECIFIED: ICD-10-CM

## 2023-11-22 DIAGNOSIS — I10 ESSENTIAL (PRIMARY) HYPERTENSION: ICD-10-CM

## 2023-11-22 PROCEDURE — 99214 OFFICE O/P EST MOD 30 MIN: CPT | Mod: 25

## 2023-11-22 PROCEDURE — 93000 ELECTROCARDIOGRAM COMPLETE: CPT

## 2023-11-29 ENCOUNTER — APPOINTMENT (OUTPATIENT)
Dept: NEUROLOGY | Facility: CLINIC | Age: 48
End: 2023-11-29

## 2024-01-15 NOTE — ASU PATIENT PROFILE, ADULT - FALL HARM RISK - ATTEMPT OOB
escitalopram (LEXAPRO) 5 MG tablet TAKE 1 TABLET BY MOUTH AT BEDTIME 90 tablet 06/14/2023 -- 1 ordered -- Aleksandra Pepe APNP [id number] - [place] [address] [address] AT [facility] [address] -- -- -- --            Summary: TAKE 1 TABLET BY MOUTH AT BEDTIME E[employer]cribe, Disp-90 tablet, R-1        Last office visit date: 11/28/23  Next appointment scheduled?: Yes   Number of refills given: 1    Upcoming appointment scheduled on: 4/3/2024   Per last office visit, patient is to follow up 4 months    Last refill on: 6/14/23     
No

## 2024-02-22 ENCOUNTER — OUTPATIENT (OUTPATIENT)
Dept: OUTPATIENT SERVICES | Facility: HOSPITAL | Age: 49
LOS: 1 days | End: 2024-02-22
Payer: COMMERCIAL

## 2024-02-22 ENCOUNTER — RESULT REVIEW (OUTPATIENT)
Age: 49
End: 2024-02-22

## 2024-02-22 ENCOUNTER — APPOINTMENT (OUTPATIENT)
Dept: CV DIAGNOSITCS | Facility: HOSPITAL | Age: 49
End: 2024-02-22

## 2024-02-22 DIAGNOSIS — I63.9 CEREBRAL INFARCTION, UNSPECIFIED: ICD-10-CM

## 2024-02-22 DIAGNOSIS — Z95.818 PRESENCE OF OTHER CARDIAC IMPLANTS AND GRAFTS: Chronic | ICD-10-CM

## 2024-02-22 DIAGNOSIS — Z98.890 OTHER SPECIFIED POSTPROCEDURAL STATES: Chronic | ICD-10-CM

## 2024-02-22 PROCEDURE — 93306 TTE W/DOPPLER COMPLETE: CPT | Mod: 26

## 2024-02-22 PROCEDURE — 76376 3D RENDER W/INTRP POSTPROCES: CPT | Mod: 26

## 2024-02-22 PROCEDURE — 76376 3D RENDER W/INTRP POSTPROCES: CPT

## 2024-02-22 PROCEDURE — 93306 TTE W/DOPPLER COMPLETE: CPT

## 2024-02-27 PROBLEM — I10 HTN (HYPERTENSION): Status: ACTIVE | Noted: 2023-08-25

## 2024-02-27 PROBLEM — E78.5 HLD (HYPERLIPIDEMIA): Status: ACTIVE | Noted: 2022-12-05

## 2024-02-27 PROBLEM — I10 BENIGN ESSENTIAL HTN: Status: ACTIVE | Noted: 2022-12-05

## 2024-02-27 PROBLEM — I63.9 CVA (CEREBRAL VASCULAR ACCIDENT): Status: ACTIVE | Noted: 2022-12-05

## 2024-02-27 NOTE — HISTORY OF PRESENT ILLNESS
[FreeTextEntry1] : Shon Colorado is a 49y/o man with Hx of HTN, HLD, DMII, and CVA (10/2022) who presents today for routine f/u. Admits CVA back in October. He was at work and fell asleep and when he awoke he couldn't move his right leg and felt heaviness throughout the right side of his body. Was seen at [facility] and was found to have a CVA. On prior visit, underwent 30 day Holter without evidence of afib. Here to be eval for PFO closure

## 2024-02-27 NOTE — DISCUSSION/SUMMARY
[FreeTextEntry1] : Plan: 1. Patient to determine timing for PFO closure 2. DAPT. [EKG obtained to assist in diagnosis and management of assessed problem(s)] : EKG obtained to assist in diagnosis and management of assessed problem(s)

## 2024-07-11 NOTE — ASU DISCHARGE PLAN (ADULT/PEDIATRIC) - DISCHARGE PLAN IS COMPLETE AND GIVEN TO PATIENT
: Yes
What Type Of Note Output Would You Prefer (Optional)?: Standard Output
Hpi Title: Evaluation of a Skin Lesion
How Severe Are Your Spot(S)?: mild
Have Your Spot(S) Been Treated In The Past?: has not been treated